# Patient Record
Sex: FEMALE | Race: WHITE
[De-identification: names, ages, dates, MRNs, and addresses within clinical notes are randomized per-mention and may not be internally consistent; named-entity substitution may affect disease eponyms.]

---

## 2018-07-12 ENCOUNTER — HOSPITAL ENCOUNTER (INPATIENT)
Dept: HOSPITAL 97 - ER | Age: 23
LOS: 1 days | Discharge: HOME | DRG: 419 | End: 2018-07-13
Attending: SURGERY | Admitting: SURGERY
Payer: COMMERCIAL

## 2018-07-12 VITALS — BODY MASS INDEX: 28.9 KG/M2

## 2018-07-12 DIAGNOSIS — K80.00: Primary | ICD-10-CM

## 2018-07-12 DIAGNOSIS — Z88.2: ICD-10-CM

## 2018-07-12 LAB
ALBUMIN SERPL BCP-MCNC: 3.8 G/DL (ref 3.4–5)
ALP SERPL-CCNC: 109 U/L (ref 45–117)
ALT SERPL W P-5'-P-CCNC: 53 U/L (ref 12–78)
AMYLASE SERPL-CCNC: 64 U/L (ref 25–115)
AST SERPL W P-5'-P-CCNC: 32 U/L (ref 15–37)
BUN BLD-MCNC: 11 MG/DL (ref 7–18)
GLUCOSE SERPLBLD-MCNC: 100 MG/DL (ref 74–106)
HCT VFR BLD CALC: 41.3 % (ref 36–45)
LIPASE SERPL-CCNC: 100 U/L (ref 73–393)
LYMPHOCYTES # SPEC AUTO: 2.2 K/UL (ref 0.7–4.9)
MCH RBC QN AUTO: 29.6 PG (ref 27–35)
MCV RBC: 85.4 FL (ref 80–100)
PMV BLD: 9.6 FL (ref 7.6–11.3)
POTASSIUM SERPL-SCNC: 3.7 MMOL/L (ref 3.5–5.1)
RBC # BLD: 4.84 M/UL (ref 3.86–4.86)
UA COMPLETE W REFLEX CULTURE PNL UR: (no result)

## 2018-07-12 PROCEDURE — 80076 HEPATIC FUNCTION PANEL: CPT

## 2018-07-12 PROCEDURE — 36415 COLL VENOUS BLD VENIPUNCTURE: CPT

## 2018-07-12 PROCEDURE — 96365 THER/PROPH/DIAG IV INF INIT: CPT

## 2018-07-12 PROCEDURE — 82150 ASSAY OF AMYLASE: CPT

## 2018-07-12 PROCEDURE — 81015 MICROSCOPIC EXAM OF URINE: CPT

## 2018-07-12 PROCEDURE — 88304 TISSUE EXAM BY PATHOLOGIST: CPT

## 2018-07-12 PROCEDURE — 83690 ASSAY OF LIPASE: CPT

## 2018-07-12 PROCEDURE — 81003 URINALYSIS AUTO W/O SCOPE: CPT

## 2018-07-12 PROCEDURE — 99285 EMERGENCY DEPT VISIT HI MDM: CPT

## 2018-07-12 PROCEDURE — 96375 TX/PRO/DX INJ NEW DRUG ADDON: CPT

## 2018-07-12 PROCEDURE — 93005 ELECTROCARDIOGRAM TRACING: CPT

## 2018-07-12 PROCEDURE — 85025 COMPLETE CBC W/AUTO DIFF WBC: CPT

## 2018-07-12 PROCEDURE — 81025 URINE PREGNANCY TEST: CPT

## 2018-07-12 PROCEDURE — 82962 GLUCOSE BLOOD TEST: CPT

## 2018-07-12 PROCEDURE — 80048 BASIC METABOLIC PNL TOTAL CA: CPT

## 2018-07-12 PROCEDURE — 76705 ECHO EXAM OF ABDOMEN: CPT

## 2018-07-12 RX ADMIN — FAMOTIDINE SCH MG: 10 INJECTION, SOLUTION INTRAVENOUS at 08:02

## 2018-07-12 RX ADMIN — MORPHINE SULFATE PRN MG: 4 INJECTION, SOLUTION INTRAMUSCULAR; INTRAVENOUS at 20:23

## 2018-07-12 RX ADMIN — FAMOTIDINE SCH MG: 10 INJECTION, SOLUTION INTRAVENOUS at 20:23

## 2018-07-12 RX ADMIN — SODIUM CHLORIDE SCH MLS: 0.9 INJECTION, SOLUTION INTRAVENOUS at 06:33

## 2018-07-12 RX ADMIN — PIPERACILLIN SODIUM AND TAZOBACTAM SODIUM SCH MLS: 3; .375 INJECTION, POWDER, LYOPHILIZED, FOR SOLUTION INTRAVENOUS at 10:38

## 2018-07-12 RX ADMIN — SODIUM CHLORIDE SCH MLS: 0.9 INJECTION, SOLUTION INTRAVENOUS at 18:02

## 2018-07-12 RX ADMIN — SODIUM CHLORIDE SCH: 0.9 INJECTION, SOLUTION INTRAVENOUS at 12:00

## 2018-07-12 RX ADMIN — PIPERACILLIN SODIUM AND TAZOBACTAM SODIUM SCH MLS: 3; .375 INJECTION, POWDER, LYOPHILIZED, FOR SOLUTION INTRAVENOUS at 18:02

## 2018-07-12 RX ADMIN — ONDANSETRON PRN MG: 2 INJECTION INTRAMUSCULAR; INTRAVENOUS at 10:34

## 2018-07-12 RX ADMIN — MORPHINE SULFATE PRN MG: 4 INJECTION, SOLUTION INTRAMUSCULAR; INTRAVENOUS at 10:34

## 2018-07-12 RX ADMIN — DEXTROSE AND SODIUM CHLORIDE SCH MLS: 5; .9 INJECTION, SOLUTION INTRAVENOUS at 20:23

## 2018-07-12 RX ADMIN — ONDANSETRON PRN MG: 2 INJECTION INTRAMUSCULAR; INTRAVENOUS at 20:23

## 2018-07-12 RX ADMIN — MORPHINE SULFATE PRN MG: 4 INJECTION, SOLUTION INTRAMUSCULAR; INTRAVENOUS at 14:28

## 2018-07-12 NOTE — HP
Date of Admission:  07/12/2018



History Of Present Illness:  This is a case of a 22-year-old patient, came this morning complaining o
f epigastric right upper quadrant pain radiating to the back, associated with nausea and vomiting.  S
he denies any family member sick at home.  Denies any recent travelling out of the country.  It has b
een about a day like that.  She denies any dysuria, hematuria, hematochezia, melena.  Denies any rece
nt traveling out of the country.  Denies any family members sick at home.



Allergies:  SULFA.



Medication:  Birth control.



Past Surgical History:  Appendectomy.



Past Medical History:  None.



Social History:  She does not smoke.  She does not drink alcohol.



Review of Systems:

Constitutional:  Denies any fever. 

Respiratory:  Denies any shortness of breast. 

Gastrointestinal:  As above. 

Genitourinary:  Denies any dysuria, hematuria, or any vaginal discharge.



Physical Examination:

General:  The patient is awake and alert. 

HEENT:  Pupils are equal and reactive, anicteric. 

Neck:  Supple. 

Chest:  Clear. 

Abdomen:  Right upper quadrant tenderness with Sesay sign positive.  The rest of the abdomen is soft
 and depressible. 

Breasts:  Deferred 

Pelvic:  Deferred. 

Rectal:  Deferred. 

Extremities:  Good capillary refill.  Cranial nerves 2 through 12 grossly within normal limits.



Laboratory Data:  WBC count of 12.1, with hemoglobin of 14.3, potassium 3.7.  CAT scan of the abdomen
 and pelvis, just report came out as cholelithiasis with thickening of the gallbladder, indicate chol
ecystitis as per Dr. Coles.



Assessment:  A 22-year-old patient with acute cholecystitis, symptomatic cholelithiasis.  The benefit
s, alternatives, and risks of laparoscopic, possible open cholecystectomy fully explained, which incl
ude, but are not limited to infection, bleeding, damage to adjacent structures, anesthesia complicati
on, choledocholithiasis, bile leak, pancreatitis, MI, and even death.  She also understands this may 
not relieve any symptoms.  She might need more than one surgical intervention.  The patient was booke
d in OR.





CESAR/MIL

DD:  07/12/2018 13:34:08Voice ID:  [id number]

## 2018-07-12 NOTE — RAD REPORT
EXAM DESCRIPTION:  US - Abdomen Exam Limited - 7/12/2018 7:17 am

 

CLINICAL HISTORY:  Abdominal pain.

 

COMPARISON:  None.

 

FINDINGS:  A 14 millimeter stone is present within the gallbladder neck. The gallbladder wall is thic
kened measuring 4 millimeters.

 

The biliary tree is normal caliber.

 

IMPRESSION:  Cholelithiasis. A stone appears lodged in the gallbladder neck. Mildly thickened gallbla
dder wall probably indicates cholecystitis

## 2018-07-12 NOTE — ER
Nurse's Notes                                                                                     

 [facility]                                                                

Name: Renita Sample                                                                              

Age: 22 yrs                                                                                       

Sex: Female                                                                                       

: 1995                                                                                   

MRN: [id number]                                                                                   

Arrival Date: 2018                                                                          

Time: 02:09                                                                                       

Account#: [id number]                                                                            

Bed 13                                                                                            

Private MD:                                                                                       

Diagnosis: Abdominal tenderness                                                                   

                                                                                                  

Presentation:                                                                                     

                                                                                             

02:21 Presenting complaint: Patient states: She started having right upper quadrant pain at   ea  

      1600 yesterday. Reports the pain feels like a stabbing pain and she feels bloated.          

      Transition of care: patient was not received from another setting of care. Onset of         

      symptoms was 2018. Risk Assessment: Do you want to hurt yourself or someone        

      else? Patient reports no desire to harm self or others. Initial Sepsis Screen: Does the     

      patient meet any 2 criteria? No. Patient's initial sepsis screen is negative. Does the      

      patient have a suspected source of infection? No. Patient's initial sepsis screen is        

      negative. Care prior to arrival: None.                                                      

02:21 Method Of Arrival: Ambulatory                                                           ea  

02:21 Acuity: QUITA 3                                                                           ea  

                                                                                                  

Triage Assessment:                                                                                

02:27 General: Appears uncomfortable, Behavior is calm, cooperative, appropriate for age.     ea  

      Pain: Complains of pain in right upper quadrant Pain does not radiate. Pain currently       

      is 9 out of 10 on a pain scale. Quality of pain is described as sharp, stabbing. GI:        

      Abdomen is non-distended.                                                                   

                                                                                                  

OB/GYN:                                                                                           

02:28 LMP 2018                                                                           ea  

                                                                                                  

Historical:                                                                                       

- Allergies:                                                                                      

02:24 Sulfa (Sulfonamide Antibiotics);                                                        ea  

- Home Meds:                                                                                      

02:24 birth control [Active];                                                                 ea  

- PMHx:                                                                                           

02:24 None;                                                                                   ea  

- PSHx:                                                                                           

02:24 Appendectomy;                                                                           ea  

                                                                                                  

- Immunization history:: Adult Immunizations up to date.                                          

- Social history:: Smoking status: Patient/guardian denies using tobacco.                         

- Ebola Screening: : No symptoms or risks identified at this time.                                

- Family history:: not pertinent.                                                                 

                                                                                                  

                                                                                                  

Screenin:29 Abuse screen: Denies threats or abuse. Nutritional screening: No deficits noted.        ea  

      Tuberculosis screening: No symptoms or risk factors identified. Fall Risk None              

      identified.                                                                                 

                                                                                                  

Assessment:                                                                                       

02:25 General: Appears uncomfortable, Behavior is cooperative, appropriate for age, anxious.  jd3 

      Pain: Complains of pain in right upper quadrant Pain currently is 9 out of 10 on a pain     

      scale. Quality of pain is described as sharp, stabbing, Is continuous. Neuro: Level of      

      Consciousness is awake, alert, obeys commands, Oriented to person, place, time,             

      situation. Cardiovascular: Heart tones S1 S2 present Capillary refill < 3 seconds           

      Patient's skin is warm and dry. Respiratory: Airway is patent Respiratory effort is         

      even, unlabored, Respiratory pattern is regular, symmetrical, Breath sounds are clear       

      bilaterally. GI: Abdomen is round Bowel sounds present X 4 quads. Abd is soft Abdomen       

      is tender to palpation in right upper quadrant. : No signs and/or symptoms were           

      reported regarding the genitourinary system. EENT: No signs and/or symptoms were            

      reported regarding the EENT system. Derm: Skin is intact, Skin is dry, Skin is normal,      

      Skin temperature is warm. Musculoskeletal: Circulation, motion, and sensation intact.       

      Range of motion: intact in all extremities.                                                 

03:33 Reassessment: Patient appears in no apparent distress at this time. Patient and/or      jd3 

      family updated on plan of care and expected duration. Pain level reassessed. Patient is     

      alert, oriented x 3, equal unlabored respirations, skin warm/dry/pink.                      

04:42 Reassessment: Patient appears in no apparent distress at this time. Patient and/or      jd3 

      family updated on plan of care and expected duration. Pain level reassessed. Patient is     

      alert, oriented x 3, equal unlabored respirations, skin warm/dry/pink. Patient states       

      feeling better.                                                                             

05:23 Reassessment: Patient appears in no apparent distress at this time. Patient and/or      jd3 

      family updated on plan of care and expected duration. Pain level reassessed. Patient is     

      alert, oriented x 3, equal unlabored respirations, skin warm/dry/pink. pt reporting         

      returning of pain sensation, provider notified, new orders received, see MAR.               

                                                                                                  

Vital Signs:                                                                                      

02:28  / 69; Pulse 80; Resp 18; Temp 97.6(O); Pulse Ox 100% on R/A; Weight 81.65 kg;    ea  

      Height 5 ft. 7 in. (170.18 cm); Pain 9/10;                                                  

03:33  / 65; Pulse 67; Resp 17 S; Pulse Ox 100% on R/A;                                 jd3 

04:41  / 76; Pulse 58; Resp 16 S; Pulse Ox 100% on R/A; Pain 3/10;                      jd3 

05:22  / 67; Pulse 66; Resp 17 S; Pulse Ox 100% on R/A; Pain 6/10;                      jd3 

02:28 Body Mass Index 28.19 (81.65 kg, 170.18 cm)                                             ea  

                                                                                                  

ED Course:                                                                                        

02:09 Patient arrived in ED.                                                                  am2 

02:23 Triage completed.                                                                       ea  

02:26 Inserted saline lock: 20 gauge in right antecubital area, using aseptic technique.      ea  

      Blood collected. Placed by Junaid SOUZA.                                                     

02:29 Patient has correct armband on for positive identification. Bed in low position. Call   ea  

      light in reach. Side rails up X2. Adult w/ patient.                                         

02:29 Arm band placed on right wrist. Patient placed in an exam room, on a stretcher, on      ea  

      pulse oximetry.                                                                             

02:34 Junaid Blanca RN is Primary Nurse.                                                  j 

02:56 Phu Toney MD is Attending Physician.                                             yoel 

03:39 Eze Diaz MD is Hospitalizing Provider.                                          [facility] 

05:55 No provider procedures requiring assistance completed. Patient admitted, IV remains in  jd3 

      place.                                                                                      

                                                                                                  

Administered Medications:                                                                         

02:46 Drug: morphine 2 mg Route: IVP; Site: right antecubital;                                jd3 

04:04 Follow up: Response: No adverse reaction                                                jd3 

02:46 Drug: Zofran 4 mg Route: IVP; Site: right antecubital;                                  jd3 

04:04 Follow up: Response: No adverse reaction                                                jd3 

02:53 Drug: morphine 2 mg Route: IVP; Site: right antecubital;                                ea  

04:05 Follow up: Response: No adverse reaction                                                jd3 

04:03 Drug: NS 0.9% 1000 ml Route: IV; Rate: 1 bolus; Site: right antecubital;                jd3 

04:39 Follow up: Response: No adverse reaction; IV Status: Completed infusion; IV Intake:     jd3 

      1000ml                                                                                      

04:04 Drug: Zosyn 3.375 grams Route: IVPB; Infused Over: 60 mins; Site: right antecubital;    jd3 

04:38 Follow up: Response: No adverse reaction; IV Status: Completed infusion                 jd3 

04:04 Drug: Pepcid 20 mg Route: IVP; Site: right antecubital;                                 jd3 

04:39 Follow up: Response: No adverse reaction                                                jd3 

05:22 Drug: morphine 4 mg Route: IVP; Site: right antecubital;                                jd3 

05:41 Follow up: Response: No adverse reaction; Pain is decreased                             jd3 

                                                                                                  

                                                                                                  

Intake:                                                                                           

04:39 IV: 1000ml; Total: 1000ml.                                                              jd3 

                                                                                                  

Outcome:                                                                                          

03:40 Decision to Hospitalize by Provider.                                                    yoel 

06:01 Admitted to Med/surg accompanied by tech, via wheelchair, room 421, with chart, Report  jd3 

      called to  Kimberly SOUZA                                                                      

06:01 Condition: stable                                                                           

06:01 Instructed on the need for admit, Demonstrated understanding of instructions.               

06:10 Patient left the ED.                                                                    ea  

                                                                                                  

Signatures:                                                                                       

Phu Toney MD MD cha Moreno, Amanda am2 Antunez, Elena RN                      RN   Junaid Santos RN                    RN   jd3                                                  

                                                                                                  

Corrections: (The following items were deleted from the chart)                                    

02:26 02:26 Inserted saline lock: 20 gauge in right antecubital area, using aseptic           ea  

      technique. Blood collected. ea                                                              

02:28 02:27 GI: Abdomen is distended, ea                                                      ea  

                                                                                                  

**************************************************************************************************

## 2018-07-12 NOTE — EKG
Test Date:    2018-07-12               Test Time:    02:25:14

Technician:   NICOLE                                     

                                                     

MEASUREMENT RESULTS:                                       

Intervals:                                           

Rate:         59                                     

VT:           118                                    

QRSD:         90                                     

QT:           398                                    

QTc:          394                                    

Axis:                                                

P:            23                                     

VT:           118                                    

QRS:          33                                     

T:            59                                     

                                                     

INTERPRETIVE STATEMENTS:                                       

                                                     

Sinus bradycardia

Otherwise normal ECG

No previous ECG available for comparison



Electronically Signed On 07-12-18 09:59:34 CDT by Uli Sheridan

## 2018-07-12 NOTE — EDPHYS
Physician Documentation                                                                           

 [facility]                                                                

Name: Renita Sample                                                                              

Age: 22 yrs                                                                                       

Sex: Female                                                                                       

: 1995                                                                                   

MRN: [id number]                                                                                   

Arrival Date: 2018                                                                          

Time: 02:09                                                                                       

Account#: [id number]                                                                            

Bed 13                                                                                            

Private MD:                                                                                       

ED Physician Phu Toney                                                                      

HPI:                                                                                              

                                                                                             

03:37 This 22 yrs old  Female presents to ER via Ambulatory with complaints of       yoel 

      Abdominal Pain.                                                                             

03:37 The patient presents with abdominal pain in the upper abdomen. Onset: The               yoel 

      symptoms/episode began/occurred 1 day(s) ago. The symptoms radiate to Associated signs      

      and symptoms: none. The symptoms are described as sharp. Modifying factors: The             

      symptoms are alleviated by nothing, the symptoms are aggravated by food. Severity of        

      pain: At its worst the pain was moderate. The patient has not experienced similar           

      symptoms in the past.                                                                       

                                                                                                  

OB/GYN:                                                                                           

02:28 LMP 2018                                                                           ea  

                                                                                                  

Historical:                                                                                       

- Allergies:                                                                                      

02:24 Sulfa (Sulfonamide Antibiotics);                                                        ea  

- Home Meds:                                                                                      

02:24 birth control [Active];                                                                 ea  

- PMHx:                                                                                           

02:24 None;                                                                                   ea  

- PSHx:                                                                                           

02:24 Appendectomy;                                                                           ea  

                                                                                                  

- Immunization history:: Adult Immunizations up to date.                                          

- Social history:: Smoking status: Patient/guardian denies using tobacco.                         

- Ebola Screening: : No symptoms or risks identified at this time.                                

- Family history:: not pertinent.                                                                 

                                                                                                  

                                                                                                  

ROS:                                                                                              

03:37 Constitutional: Negative for fever, chills, and weight loss, Eyes: Negative for injury, yoel 

      pain, redness, and discharge, ENT: Negative for injury, pain, and discharge, Neck:          

      Negative for injury, pain, and swelling, Cardiovascular: Negative for chest pain,           

      palpitations, and edema, Respiratory: Negative for shortness of breath, cough,              

      wheezing, and pleuritic chest pain, Back: Negative for injury and pain, : Negative        

      for injury, bleeding, discharge, and swelling, MS/Extremity: Negative for injury and        

      deformity, Skin: Negative for injury, rash, and discoloration, Neuro: Negative for          

      headache, weakness, numbness, tingling, and seizure, Psych: Negative for depression,        

      anxiety, suicide ideation, homicidal ideation, and hallucinations, Allergy/Immunology:      

      Negative for hives, rash, and allergies, Endocrine: Negative for neck swelling,             

      polydipsia, polyuria, polyphagia, and marked weight changes, Hematologic/Lymphatic:         

      Negative for swollen nodes, abnormal bleeding, and unusual bruising.                        

03:37 Abdomen/GI: Positive for abdominal pain, of the epigastric area and right upper             

      quadrant.                                                                                   

                                                                                                  

Exam:                                                                                             

03:37 Constitutional:  This is a well developed, well nourished patient who is awake, alert,  yoel 

      and in no acute distress. Head/Face:  Normocephalic, atraumatic. Eyes:  Pupils equal        

      round and reactive to light, extra-ocular motions intact.  Lids and lashes normal.          

      Conjunctiva and sclera are non-icteric and not injected.  Cornea within normal limits.      

      Periorbital areas with no swelling, redness, or edema. ENT:  Nares patent. No nasal         

      discharge, no septal abnormalities noted.  Tympanic membranes are normal and external       

      auditory canals are clear.  Oropharynx with no redness, swelling, or masses, exudates,      

      or evidence of obstruction, uvula midline.  Mucous membranes moist. Neck:  Trachea          

      midline, no thyromegaly or masses palpated, and no cervical lymphadenopathy.  Supple,       

      full range of motion without nuchal rigidity, or vertebral point tenderness.  No            

      Meningismus. Chest/axilla:  Normal chest wall appearance and motion.  Nontender with no     

      deformity.  No lesions are appreciated. Cardiovascular:  Regular rate and rhythm with a     

      normal S1 and S2.  No gallops, murmurs, or rubs.  Normal PMI, no JVD.  No pulse             

      deficits. Respiratory:  Lungs have equal breath sounds bilaterally, clear to                

      auscultation and percussion.  No rales, rhonchi or wheezes noted.  No increased work of     

      breathing, no retractions or nasal flaring. Back:  No spinal tenderness.  No                

      costovertebral tenderness.  Full range of motion. Female :  Normal external               

      genitalia. Skin:  Warm, dry with normal turgor.  Normal color with no rashes, no            

      lesions, and no evidence of cellulitis. MS/ Extremity:  Pulses equal, no cyanosis.          

      Neurovascular intact.  Full, normal range of motion. Neuro:  Awake and alert, GCS 15,       

      oriented to person, place, time, and situation.  Cranial nerves II-XII grossly intact.      

      Motor strength 5/5 in all extremities.  Sensory grossly intact.  Cerebellar exam            

      normal.  Normal gait. Psych:  Awake, alert, with orientation to person, place and time.     

       Behavior, mood, and affect are within normal limits.                                       

03:37 Abdomen/GI: Inspection: abdomen appears normal, Bowel sounds: normal, Palpation: mild       

      abdominal tenderness, moderate abdominal tenderness, in the epigastric area and right       

      upper quadrant, Indicators: Sesay's sign is positive, Liver: no appreciated palpable       

      abnormalities, Hernia: not appreciated.                                                     

                                                                                                  

Vital Signs:                                                                                      

02:28  / 69; Pulse 80; Resp 18; Temp 97.6(O); Pulse Ox 100% on R/A; Weight 81.65 kg;    ea  

      Height 5 ft. 7 in. (170.18 cm); Pain 9/10;                                                  

03:33  / 65; Pulse 67; Resp 17 S; Pulse Ox 100% on R/A;                                 jd3 

04:41  / 76; Pulse 58; Resp 16 S; Pulse Ox 100% on R/A; Pain 3/10;                      jd3 

05:22  / 67; Pulse 66; Resp 17 S; Pulse Ox 100% on R/A; Pain 6/10;                      jd3 

02:28 Body Mass Index 28.19 (81.65 kg, 170.18 cm)                                               

                                                                                                  

MDM:                                                                                              

02:56 Patient medically screened.                                                             [facility] 

03:37 Data reviewed: vital signs, nurses notes, lab test result(s), radiologic studies,       yoel 

      ultrasound.                                                                                 

                                                                                                  

                                                                                             

02:25 Order name: Amylase, Serum; Complete Time: 03:28                                        ea  

                                                                                             

02:25 Order name: Basic Metabolic Panel; Complete Time: 03:28                                 ea  

                                                                                             

02:25 Order name: CBC with Diff; Complete Time: 03:28                                         ea  

                                                                                             

02:25 Order name: Hepatic Function; Complete Time: 03:28                                      ea  

                                                                                             

02:25 Order name: Lipase; Complete Time: 03:28                                                ea  

                                                                                             

02:25 Order name: Urine Microscopic Only; Complete Time: 03:28                                ea  

                                                                                             

03:01 Order name: Urine Dipstick--Ancillary (enter results); Complete Time: 03:28             ms  

                                                                                             

03:01 Order name: Urine Pregnancy--Ancillary (enter results); Complete Time: 03:28            ms  

                                                                                             

03:46 Order name: Basic Metabolic Panel                                                       [facility]

                                                                                             

03:46 Order name: Basic Metabolic Panel                                                       [facility]

                                                                                             

03:46 Order name: CBC with Automated Diff                                                     [facility]

                                                                                             

03:46 Order name: CBC with Automated Diff                                                     [facility]

                                                                                             

03:46 Order name: Lipase                                                                      [facility]

                                                                                             

02:25 Order name: Urine Pregnancy Test (obtain specimen); Complete Time: 02:36                ea  

                                                                                             

02:25 Order name: IV Saline Lock; Complete Time: 02:25                                        ea  

                                                                                             

02:25 Order name: Labs collected and sent; Complete Time: 02:25                               ea  

                                                                                             

02:25 Order name: EKG; Complete Time: 02:26                                                   ea  

                                                                                             

03:37 Order name: US Abdomen Limited                                                          [facility] 

                                                                                             

03:46 Order name: NPO                                                                         [facility]

                                                                                             

03:46 Order name: Lipase                                                                      [facility]

                                                                                             

03:46 Order name: Liver (Hepatic) Function                                                    [facility]

                                                                                             

03:46 Order name: Liver (Hepatic) Function                                                    [facility]

                                                                                             

02:25 Order name: Urine Dipstick-Ancillary (obtain specimen); Complete Time: 02:36            ea  

                                                                                             

02:25 Order name: EKG - Nurse/Tech; Complete Time: 02:25                                      ea  

                                                                                                  

Administered Medications:                                                                         

02:46 Drug: morphine 2 mg Route: IVP; Site: right antecubital;                                jd3 

04:04 Follow up: Response: No adverse reaction                                                jd3 

02:46 Drug: Zofran 4 mg Route: IVP; Site: right antecubital;                                  jd3 

04:04 Follow up: Response: No adverse reaction                                                jd3 

02:53 Drug: morphine 2 mg Route: IVP; Site: right antecubital;                                ea  

04:05 Follow up: Response: No adverse reaction                                                jd3 

04:03 Drug: NS 0.9% 1000 ml Route: IV; Rate: 1 bolus; Site: right antecubital;                jd3 

04:39 Follow up: Response: No adverse reaction; IV Status: Completed infusion; IV Intake:     jd3 

      1000ml                                                                                      

04:04 Drug: Zosyn 3.375 grams Route: IVPB; Infused Over: 60 mins; Site: right antecubital;    jd3 

04:38 Follow up: Response: No adverse reaction; IV Status: Completed infusion                 jd3 

04:04 Drug: Pepcid 20 mg Route: IVP; Site: right antecubital;                                 jd3 

04:39 Follow up: Response: No adverse reaction                                                jd3 

05:22 Drug: morphine 4 mg Route: IVP; Site: right antecubital;                                jd3 

05:41 Follow up: Response: No adverse reaction; Pain is decreased                             jd3 

                                                                                                  

                                                                                                  

Disposition:                                                                                      

18 03:40 Hospitalization ordered by Eze Diaz for Observation. Preliminary            

  diagnosis is Abdominal tenderness.                                                              

- Bed requested for Telemetry/MedSurg (observation).                                              

- Status is Observation.                                                                      ea  

- Condition is Stable.                                                                            

- Problem is new.                                                                                 

- Symptoms have improved.                                                                         

UTI on Admission? No                                                                              

                                                                                                  

                                                                                                  

                                                                                                  

Signatures:                                                                                       

Dispatcher [employer]                           EDMS                                                 

Jennifer Duarte RN                     Phu Sapp MD MD cha Antunez, Elena, RN RN ea Davies, Jonathon, RN RN   jd3                                                  

                                                                                                  

Corrections: (The following items were deleted from the chart)                                    

02:48 02:26 Creatinine for Radiology+C.LAB.BRZ ordered. [facility]                                  EDMS

05:30 03:40 Hospitalization Ordered by Eze Diaz MD for Observation. Preliminary           

      diagnosis is Abdominal tenderness. Bed requested for Telemetry/MedSurg (observation).       

      Status is Observation. Condition is Stable. Problem is new. Symptoms have improved. UTI     

      on Admission? No. yoel                                                                       

06:10 05:30 2018 03:40 Hospitalization Ordered by Eze Diaz MD for Observation.    ea  

      Preliminary diagnosis is Abdominal tenderness. Bed requested for Telemetry/MedSurg          

      (observation). Status is Observation. Condition is Stable. Problem is new. Symptoms         

      have improved. UTI on Admission? No. kl                                                     

                                                                                                  

**************************************************************************************************

## 2018-07-13 VITALS — DIASTOLIC BLOOD PRESSURE: 65 MMHG | SYSTOLIC BLOOD PRESSURE: 109 MMHG | TEMPERATURE: 97.3 F

## 2018-07-13 VITALS — OXYGEN SATURATION: 100 %

## 2018-07-13 LAB
ALBUMIN SERPL BCP-MCNC: 2.9 G/DL (ref 3.4–5)
ALP SERPL-CCNC: 83 U/L (ref 45–117)
ALT SERPL W P-5'-P-CCNC: 42 U/L (ref 12–78)
AST SERPL W P-5'-P-CCNC: 24 U/L (ref 15–37)
BUN BLD-MCNC: 7 MG/DL (ref 7–18)
GLUCOSE SERPLBLD-MCNC: 90 MG/DL (ref 74–106)
HCT VFR BLD CALC: 36.8 % (ref 36–45)
LIPASE SERPL-CCNC: 102 U/L (ref 73–393)
LYMPHOCYTES # SPEC AUTO: 2.3 K/UL (ref 0.7–4.9)
MCH RBC QN AUTO: 30 PG (ref 27–35)
MCV RBC: 86.1 FL (ref 80–100)
PMV BLD: 9.4 FL (ref 7.6–11.3)
POTASSIUM SERPL-SCNC: 3.3 MMOL/L (ref 3.5–5.1)
RBC # BLD: 4.27 M/UL (ref 3.86–4.86)

## 2018-07-13 PROCEDURE — 0FT44ZZ RESECTION OF GALLBLADDER, PERCUTANEOUS ENDOSCOPIC APPROACH: ICD-10-PCS

## 2018-07-13 RX ADMIN — DEXTROSE AND SODIUM CHLORIDE SCH MLS: 5; .9 INJECTION, SOLUTION INTRAVENOUS at 05:15

## 2018-07-13 RX ADMIN — DEXTROSE AND SODIUM CHLORIDE SCH: 5; .9 INJECTION, SOLUTION INTRAVENOUS at 16:00

## 2018-07-13 RX ADMIN — FAMOTIDINE SCH MG: 10 INJECTION, SOLUTION INTRAVENOUS at 09:28

## 2018-07-13 RX ADMIN — PIPERACILLIN SODIUM AND TAZOBACTAM SODIUM SCH MLS: 3; .375 INJECTION, POWDER, LYOPHILIZED, FOR SOLUTION INTRAVENOUS at 00:27

## 2018-07-13 RX ADMIN — PIPERACILLIN SODIUM AND TAZOBACTAM SODIUM SCH: 3; .375 INJECTION, POWDER, LYOPHILIZED, FOR SOLUTION INTRAVENOUS at 17:00

## 2018-07-13 RX ADMIN — PIPERACILLIN SODIUM AND TAZOBACTAM SODIUM SCH MLS: 3; .375 INJECTION, POWDER, LYOPHILIZED, FOR SOLUTION INTRAVENOUS at 09:28

## 2018-07-13 NOTE — OP
Date of Procedure:  07/13/2018



Surgeon:  Eze Diaz MD



Preoperative Diagnosis:  Acute cholecystitis, symptomatic cholelithiasis.



Postoperative Diagnosis:  Acute cholecystitis, symptomatic cholelithiasis.



Procedure:  Laparoscopic cholecystectomy.



Specimen:  Gallbladder.



Findings:  Acute cholecystitis, symptomatic cholelithiasis.



Anesthesia:  General plus local.



Indications:  This is the case of a 22-year-old patient, comes to us with acute intractable abdominal
 pain associated with nausea and vomiting.  The patient had to be admitted to the hospital for intrac
table abdominal pain.  The benefits, alternatives, and risks of laparoscopic, possible open cholecyst
ectomy fully explained to the patient, which include, but are not limited to infection, bleeding, dam
age to adjacent structures, anesthesia complication, choledocholithiasis, bile leak, pancreatitis, MI
, and even death.  She also understands this may not relieve any symptoms.  She might need more than 
one surgical intervention.  She understood and signed a consent.



Description Of Procedure:  The patient was brought to the operating room, placed in supine position. 
 Anesthesia was done without complication.  Abdominal area was prepped and draped in usual sterile fa
shion.  Marcaine 0.5% injected for local anesthetic, followed by sharp incision of the skin in the in
fraumbilical region.  Incision was carried down to fascia, which was opened under direct vision.  Per
itoneum was encountered, opened under direct vision.  Vicryl #1 placed inside the fascia.  Mark tro
car was carefully introduced.  No bleeding was obtained.  We placed 3 more trocars, 5 mm each of them
 in the right upper quadrant under direct visualization.  This allowed me to put a grasper in the fun
dus of the gallbladder, another grasper in the infundibulum.  We retracted the gallbladder in the inf
erolateral fashion exposing the triangle of Calot and obtaining critical view of safety.  Cystic duct
 and cystic artery were clearly isolated free circumferentially and a connection between those and th
e gallbladder were clearly identified.  I proceeded to ligate those by using at least 3 clips proxima
l, 1 clip distal, ligation in middle.  Same was done with the cystic artery.  No bile leak.  No bleed
ing.  The gallbladder was removed from liver using Bovie cauterizer and removed from abdominal cavity
 using an EndoCatch through the umbilical incision.  The area was inspected once again.  No bile leak
.  No bleeding.  Gallbladder fossa was intact.  At that moment, I proceeded to remove the trocars und
er direct vision.  Deflated pneumoperitoneum.  Closed the fascia with #1 Vicryl.  Irrigated subcutane
ous tissue, closed that with 3-0 chromic and skin with staples.  Sponge count and instrument counts w
ere correct.  The patient tolerated the procedure well.  The patient was sent to recovery in stable c
ondition.



Diagnosis:  Acute cholecystitis symptomatic, cholelithiasis.



Procedure:  Laparoscopic cholecystectomy.



Plan:  The patient will go to the floor.  If she tolerates diet, then she will be able to go home.  BRAYDON vivar up in my office in 1 week.  Call for appointment [phone].  Keep area dry for 48 hours, then ma
y shower.  Keep Steri-Strips intact.



Medications:  Include Tylenol No. 3 q.4 hours p.r.n. pain, Cipro 500 p.o. q.12. 



Do not lift more than 20 pounds lifting.  Keep area dry for 48 hours, then may shower.  The patient i
s planning to go back to her hometown in [place].  She was advised if she goes back there to jefferson andino up with surgeon about a week from now to get the staples out.





CESAR/MIL

DD:  07/13/2018 15:05:47Voice ID:  [id number]

DT:  07/13/2018 18:16:11Report ID:  [id number] 1 or 2

## 2018-07-13 NOTE — P.BOP
Preoperative diagnosis: acute cholecystitis, symptomatic cholelithiasis


Postoperative diagnosis: same


Primary procedure: Laparoscopic cholecystectomy


Estimated blood loss: <10cc


Specimen: gb


Findings: acute cholecystitis, symptomatic cholelithiasis


Anesthesia: General


Complications: None


Transferred to: Recovery Room


Condition: Good